# Patient Record
Sex: FEMALE | Employment: FULL TIME | URBAN - METROPOLITAN AREA
[De-identification: names, ages, dates, MRNs, and addresses within clinical notes are randomized per-mention and may not be internally consistent; named-entity substitution may affect disease eponyms.]

---

## 2020-12-15 ENCOUNTER — HOSPITAL ENCOUNTER (OUTPATIENT)
Dept: PHYSICAL THERAPY | Age: 25
Discharge: HOME | End: 2020-12-15
Payer: OTHER GOVERNMENT

## 2020-12-15 PROCEDURE — 97162 PT EVAL MOD COMPLEX 30 MIN: CPT

## 2020-12-15 PROCEDURE — 97014 ELECTRIC STIMULATION THERAPY: CPT
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
## 2020-12-15 NOTE — PROGRESS NOTES
PHYSICAL THERAPY - DAILY TREATMENT NOTE    Patient Name: Jen Welsh        Date: 12/15/2020  : 1995   YES Patient  Verified  Visit #:     Insurance: Payor:  / Plan: Tommy Muniz DEPENDENTS / Product Type:  /      In time: 9:30 A Out time: 10:25 A   Total Treatment Time: 50     BCBS/Medicare Time Tracking (below)   Total Timed Codes (min):  50 1:1 Treatment Time:  50     TREATMENT AREA = Neck pain [M54.2]    SUBJECTIVE  Pain Level (on 0 to 10 scale):  6  / 10   Medication Changes/New allergies or changes in medical history, any new surgeries or procedures?     NO    If yes, update Summary List   Subjective Functional Status/Changes:  []  No changes reported     See POC           Modalities Rationale:     decrease edema, decrease inflammation and decrease pain to improve patient's ability to return to pain-free c/s AROM with driving   15 min [x] Estim, type/location: IFC to c/s in supine                                      []  att     [x]  unatt     []  w/US     []  w/ice    [x]  w/heat    min []  Mechanical Traction: type/lbs                   []  pro   []  sup   []  int   []  cont    []  before manual    []  after manual    min []  Ultrasound, settings/location:      min []  Iontophoresis w/ dexamethasone, location:                                               []  take home patch       []  in clinic    min []  Ice     []  Heat    location/position:     min []  Vasopneumatic Device, press/temp:     min []  Other:    [] Skin assessment post-treatment (if applicable):    [x]  intact    [x]  redness- no adverse reaction     []redness  adverse reaction:      Billed With/As:   [] TE   [] TA   [] Neuro   [] Self Care Patient Education: [x] Review HEP    [] Progressed/Changed HEP based on:   [] positioning   [] body mechanics   [] transfers   [] heat/ice application    [] other:      Other Objective/Functional Measures:    See POC     Post Treatment Pain Level (on 0 to 10) scale:   4  / 10     ASSESSMENT  Assessment/Changes in Function:     See POC     []  See Progress Note/Recertification   Patient will continue to benefit from skilled PT services to modify and progress therapeutic interventions, address functional mobility deficits, address ROM deficits, address strength deficits and instruct in home and community integration to attain remaining goals. Progress toward goals / Updated goals:    Progressing towards goals established at [address]  []  Upgrade activities as tolerated YES Continue plan of care   []  Discharge due to :    []  Other:      Therapist: Román Mane, PT    Date: 12/15/2020 Time: 9:44 AM     No future appointments.

## 2020-12-18 ENCOUNTER — HOSPITAL ENCOUNTER (OUTPATIENT)
Dept: PHYSICAL THERAPY | Age: 25
Discharge: HOME | End: 2020-12-18
Payer: OTHER GOVERNMENT

## 2020-12-18 PROCEDURE — 97110 THERAPEUTIC EXERCISES: CPT

## 2020-12-18 PROCEDURE — 97014 ELECTRIC STIMULATION THERAPY: CPT

## 2020-12-18 NOTE — PROGRESS NOTES
PHYSICAL THERAPY - DAILY TREATMENT NOTE    Patient Name: Nadira Barnett        Date: 2020  : 1995   YES Patient  Verified  Visit #:      of   12  Insurance: Payor:  / Plan: Tommy Muniz DEPENDENTS / Product Type:  /      In time: 7:20 A Out time: 8:15 A   Total Treatment Time: 50     BCBS/Medicare Time Tracking (below)   Total Timed Codes (min):  NA 1:1 Treatment Time:  NA     TREATMENT AREA = Neck pain [M54.2]    SUBJECTIVE  Pain Level (on 0 to 10 scale):  6  / 10   Medication Changes/New allergies or changes in medical history, any new surgeries or procedures?     NO    If yes, update Summary List   Subjective Functional Status/Changes:  []  No changes reported     Patient reports she is having some pain in her neck & into L upper arm            Modalities Rationale:     decrease pain and increase tissue extensibility to improve patient's ability to return to pain-free ADLs   10 min [x] Estim, type/location: IFC to l/s in supine                                     []  att     [x]  unatt     []  w/US     []  w/ice    [x]  w/heat    min []  Mechanical Traction: type/lbs                   []  pro   []  sup   []  int   []  cont    []  before manual    []  after manual    min []  Ultrasound, settings/location:      min []  Iontophoresis w/ dexamethasone, location:                                               []  take home patch       []  in clinic    min []  Ice     []  Heat    location/position:     min []  Vasopneumatic Device, press/temp:     min []  Other:    [] Skin assessment post-treatment (if applicable):    []  intact    []  redness- no adverse reaction     []redness  adverse reaction:        40 min Therapeutic Exercise:  [x]  See flow sheet   Rationale:      increase ROM and increase strength to improve the patients ability to return to light lifting      Billed With/As:   [] TE   [] TA   [] Neuro   [] Self Care Patient Education: [x] Review HEP    [] Progressed/Changed HEP based on:   [] positioning   [] body mechanics   [] transfers   [] heat/ice application    [] other:      Other Objective/Functional Measures:    Initiated exercise per flow sheet      Post Treatment Pain Level (on 0 to 10) scale:   3  / 10     ASSESSMENT  Assessment/Changes in Function:     Good tolerance to initial program      []  See Progress Note/Recertification   Patient will continue to benefit from skilled PT services to modify and progress therapeutic interventions, address functional mobility deficits, address ROM deficits, address strength deficits, analyze and modify body mechanics/ergonomics and instruct in home and community integration to attain remaining goals.    Progress toward goals / Updated goals:    Progressing towards goals established at [address] [facility] [address]  []  Upgrade activities as tolerated YES Continue plan of care   []  Discharge due to :    [x]  Other: Patient needs updated HEP     Therapist: Shanique Kate PT    Date: 12/18/2020 Time: 9:30 AM     Future Appointments   Date Time Provider Federico Grewal   12/23/2020  8:15 AM Marc Watson, PT [facility]   12/28/2020 12:30 PM Ander Singh PT [facility]   12/30/2020 10:30 AM Marc Watson, PT [facility]   1/6/2021  8:15 AM Marc Watson, PT [facility]   1/7/2021  8:00 AM Mariely Gusman, PT [facility]   1/11/2021  7:15 AM Mariely Gusman, PT [facility]   1/15/2021  8:00 AM Mariely Gusman, PT [facility]   1/20/2021  8:15 AM Marc Watson, PT [facility]   1/21/2021  7:15 AM Mariely Gusman, PT [facility]   1/25/2021  7:15 AM Mariely Gusman, PT [facility]   1/29/2021  8:00 AM Alyssa Beach, PT [facility]

## 2020-12-23 ENCOUNTER — HOSPITAL ENCOUNTER (OUTPATIENT)
Dept: PHYSICAL THERAPY | Age: 25
Discharge: HOME | End: 2020-12-23
Payer: OTHER GOVERNMENT

## 2020-12-23 PROCEDURE — 97110 THERAPEUTIC EXERCISES: CPT

## 2020-12-23 PROCEDURE — 97140 MANUAL THERAPY 1/> REGIONS: CPT

## 2020-12-23 NOTE — PROGRESS NOTES
PHYSICAL THERAPY - DAILY TREATMENT NOTE    Patient Name: Guerita Finley        Date: 2020  : 1995   YES Patient  Verified  Visit #:   3   of   12  Insurance: Payor:  / Plan: Tommy Muniz DEPENDENTS / Product Type:  /      In time: 36 Out time: 900   Total Treatment Time: 40     BCBS/Medicare Time Tracking (below)   Total Timed Codes (min):   1:1 Treatment Time:       TREATMENT AREA =  Neck pain [M54.2]    SUBJECTIVE  Pain Level (on 0 to 10 scale):  5  / 10   Medication Changes/New allergies or changes in medical history, any new surgeries or procedures? NO    If yes, update Summary List   Subjective Functional Status/Changes:  []  No changes reported     I have a HA most days and sometimes wake up with one.  I always have knots in my muscles neck and back of my L shoulder        Modalities Rationale:     decrease inflammation, decrease pain and increase tissue extensibility to improve patient's ability to perform ADLs   min [] Estim, type/location:                                      []  att     []  unatt     []  w/US     []  w/ice    []  w/heat    min []  Mechanical Traction: type/lbs                   []  pro   []  sup   []  int   []  cont    []  before manual    []  after manual    min []  Ultrasound, settings/location:      min []  Iontophoresis w/ dexamethasone, location:                                               []  take home patch       []  in clinic   PD min []  Ice     []  Heat    location/position:     min []  Vasopneumatic Device, press/temp:     min []  Other:    [x] Skin assessment post-treatment (if applicable):    [x]  intact    [x]  redness- no adverse reaction     []redness  adverse reaction:        30 min Therapeutic Exercise:  [x]  See flow sheet   Rationale:      increase ROM and increase strength to improve the patients ability to perform unlimted ADLs     10 min Manual Therapy: Technique:      [x] S/DTM []IASTM []PROM  [] Passive Stretching  [x]manual TPR  [x]Jt manipulation:Gr I [] II []  III [x] IV[x] V[]  Treatment/Area:  SOR and inhibitive distraction, OA fwd nod L>R, OA rot at end range SBL   Rationale:      decrease pain, increase ROM, increase tissue extensibility and decrease trigger points to improve patient's ability to dec HA frequency  The manual therapy interventions were performed at a separate and distinct time from the therapeutic activities interventions. Billed With/As:   [x] TE   [] TA   [] Neuro   [] Self Care Patient Education: [x] Review HEP    [] Progressed/Changed HEP based on:   [] positioning   [] body mechanics   [] transfers   [] heat/ice application    [x] other: Issued written HEP and reviewed use of cervical roll in pillow when sleeping     Other Objective/Functional Measures:    Improved L OA fwd nod after MT  TE per FS     Post Treatment Pain Level (on 0 to 10) scale:   3-4  / 10     ASSESSMENT  Assessment/Changes in Function:     Good release of TP throughout L UT, rhomboids and infraspinatus with theracane. Difficulty performing diaphragm breathing without significant recruitment of secondary/upper respiratory mm     []  See Progress Note/Recertification   Patient will continue to benefit from skilled PT services to modify and progress therapeutic interventions, address functional mobility deficits, address ROM deficits, address strength deficits, analyze and address soft tissue restrictions, analyze and cue movement patterns, analyze and modify body mechanics/ergonomics, assess and modify postural abnormalities, address imbalance/dizziness and instruct in home and community integration to attain remaining goals.    Progress toward goals / Updated goals:    progresing towards STG1     PLAN  [x]  Upgrade activities as tolerated YES Continue plan of care   []  Discharge due to :    []  Other:      Therapist: Skye Peter, PT, DPT, MTC, CMTPT    Date: 12/23/2020 Time: 4:58 PM     Future Appointments   Date Time Provider Federico Grewal   12/28/2020 12:30 PM [facility] Waite, [facility]   12/30/2020 10:30 AM Shaun Gleason, PT [facility]   1/6/2021  8:15 AM Shaun Gleason, PT [facility]   1/7/2021  8:00 AM Lenoard Kiss, PT [facility]   1/11/2021  7:15 AM Lenoard Kiss, PT [facility]   1/15/2021  8:00 AM Lenoard Kiss, PT [facility]   1/20/2021  8:00 AM Valery Ahr [facility]   1/21/2021  7:15 AM Lenoard Kiss, PT [facility]   1/25/2021  7:15 AM Lenoard Kiss, PT [facility]   1/26/2021  7:15 AM Amina Waite, PT [facility]

## 2020-12-28 ENCOUNTER — HOSPITAL ENCOUNTER (OUTPATIENT)
Dept: PHYSICAL THERAPY | Age: 25
Discharge: HOME | End: 2020-12-28
Payer: OTHER GOVERNMENT

## 2020-12-28 PROCEDURE — 97014 ELECTRIC STIMULATION THERAPY: CPT

## 2020-12-28 PROCEDURE — 97110 THERAPEUTIC EXERCISES: CPT

## 2020-12-28 PROCEDURE — 97140 MANUAL THERAPY 1/> REGIONS: CPT
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
## 2020-12-30 ENCOUNTER — HOSPITAL ENCOUNTER (OUTPATIENT)
Dept: PHYSICAL THERAPY | Age: 25
Discharge: HOME | End: 2020-12-30
Payer: OTHER GOVERNMENT

## 2020-12-30 PROCEDURE — 97140 MANUAL THERAPY 1/> REGIONS: CPT

## 2020-12-30 PROCEDURE — 97110 THERAPEUTIC EXERCISES: CPT

## 2020-12-30 PROCEDURE — 97014 ELECTRIC STIMULATION THERAPY: CPT

## 2020-12-30 NOTE — PROGRESS NOTES
PHYSICAL THERAPY - DAILY TREATMENT NOTE    Patient Name: Kiran Wallace        Date: 2020  : 1995   YES Patient  Verified  Visit #:      12  Insurance: Payor:  / Plan: ScottTommy Calabrese DEPENDENTS / Product Type:  /      In time: 8887 Out time: 1140   Total Treatment Time: 60     BCBS/Medicare Time Tracking (below)   Total Timed Codes (min):   1:1 Treatment Time:       TREATMENT AREA =  Neck pain [M54.2]    SUBJECTIVE  Pain Level (on 0 to 10 scale):  0  / 10   Medication Changes/New allergies or changes in medical history, any new surgeries or procedures? NO    If yes, update Summary List   Subjective Functional Status/Changes:  []  No changes reported     No HA today or yesterday and no pain/tingling in my L arm.  Mary been working on breathing using my diaphragm more        Modalities Rationale:     decrease inflammation, decrease pain and increase tissue extensibility to improve patient's ability to perform ADLs  15 min [x] Estim, type/location: IFC to C/S in supine                                     []  att     [x]  unatt     []  w/US     []  w/ice    [x]  w/heat    min []  Mechanical Traction: type/lbs                   []  pro   []  sup   []  int   []  cont    []  before manual    []  after manual    min []  Ultrasound, settings/location:      min []  Iontophoresis w/ dexamethasone, location:                                               []  take home patch       []  in clinic    min []  Ice     []  Heat    location/position:     min []  Vasopneumatic Device, press/temp:     min []  Other:    [x] Skin assessment post-treatment (if applicable):    [x]  intact    [x]  redness- no adverse reaction     []redness  adverse reaction:        35 min Therapeutic Exercise:  [x]  See flow sheet   Rationale:      increase ROM and increase strength to improve the patients ability to perform unlimted ADLs     10 min Manual Therapy: Technique:      [x] S/DTM []IASTM []PROM  [x] Passive Stretching  [x]manual TPR  [x]Jt manipulation:Gr I [] II []  III [x] IV[x] V[]  Treatment/Area:  SOR and inhibitive distraction, OA fwd nod L>R, OA side glide, CR U UT and lev scap stretch   Rationale:      decrease pain, increase ROM, increase tissue extensibility and decrease trigger points to improve patient's ability to dec HA frequency  The manual therapy interventions were performed at a separate and distinct time from the therapeutic activities interventions. Billed With/As:   [x] TE   [] TA   [] Neuro   [] Self Care Patient Education: [x] Review HEP    [] Progressed/Changed HEP based on:   [] positioning   [] body mechanics   [] transfers   [] heat/ice application    [x] other:      Other Objective/Functional Measures:    TE per FS, added book opener and tband row with rotation     Post Treatment Pain Level (on 0 to 10) scale:   0  / 10     ASSESSMENT  Assessment/Changes in Function:     Repeated cueing to dec UT recruitment and isolate scap retract with T/S ext during stand tband exercises     []  See Progress Note/Recertification   Patient will continue to benefit from skilled PT services to modify and progress therapeutic interventions, address functional mobility deficits, address ROM deficits, address strength deficits, analyze and address soft tissue restrictions, analyze and cue movement patterns, analyze and modify body mechanics/ergonomics, assess and modify postural abnormalities, address imbalance/dizziness and instruct in home and community integration to attain remaining goals.    Progress toward goals / Updated goals:    met STG1     PLAN  [x]  Upgrade activities as tolerated YES Continue plan of care   []  Discharge due to :    []  Other:      Therapist: Amy Jordan, PT, DPT, MTC, CMTPT    Date: 12/30/2020 Time: 4:58 PM     Future Appointments   Date Time Provider Federico Grewal   1/6/2021  8:15 AM Betina Singh PT [facility]   1/7/2021  8:00 AM Baylee Jim Taylor, PT [facility]   1/11/2021  7:15 AM Lay Hu, PT [facility]   1/15/2021  8:00 AM Lay Hu, PT [facility]   1/20/2021  8:00 AM Yesika Wood [facility]   1/21/2021  7:15 AM Lay Hu, PT [facility]   1/25/2021  7:15 AM Lay Hu, PT [facility]   1/26/2021  7:15 AM Denzil Mcburney, PT [facility]

## 2021-01-06 ENCOUNTER — HOSPITAL ENCOUNTER (OUTPATIENT)
Dept: PHYSICAL THERAPY | Age: 26
Discharge: HOME | End: 2021-01-06
Payer: OTHER GOVERNMENT

## 2021-01-06 PROCEDURE — 97110 THERAPEUTIC EXERCISES: CPT

## 2021-01-06 PROCEDURE — 97530 THERAPEUTIC ACTIVITIES: CPT

## 2021-01-06 NOTE — PROGRESS NOTES
PHYSICAL THERAPY - DAILY TREATMENT NOTE    Patient Name: Ashley Neal        Date: 2021  : 1995   YES Patient  Verified  Visit #:      12  Insurance: Payor:  / Plan: Tommy Muniz DEPENDENTS / Product Type:  /      In time: 815 Out time: 905   Total Treatment Time: 45     BCBS/Medicare Time Tracking (below)   Total Timed Codes (min):   1:1 Treatment Time:       TREATMENT AREA =  Neck pain [M54.2]    SUBJECTIVE  Pain Level (on 0 to 10 scale):  5  / 10   Medication Changes/New allergies or changes in medical history, any new surgeries or procedures? NO    If yes, update Summary List   Subjective Functional Status/Changes:  []  No changes reported     Still havent had a headache and no tingling in my arm. Just tightness throughout my neck and back of shoulder but the exercises are helping.  My back has been bothering me more and I have tingling down my leg, worse when I'm sitting and bending over        Modalities Rationale:     decrease inflammation, decrease pain and increase tissue extensibility to improve patient's ability to perform ADLs  PD min [] Estim, type/location:                                     []  att     [x]  unatt     []  w/US     []  w/ice    [x]  w/heat    min []  Mechanical Traction: type/lbs                   []  pro   []  sup   []  int   []  cont    []  before manual    []  after manual    min []  Ultrasound, settings/location:      min []  Iontophoresis w/ dexamethasone, location:                                               []  take home patch       []  in clinic    min []  Ice     []  Heat    location/position:     min []  Vasopneumatic Device, press/temp:     min []  Other:    [x] Skin assessment post-treatment (if applicable):    [x]  intact    [x]  redness- no adverse reaction     []redness  adverse reaction:        30 min Therapeutic Exercise:  [x]  See flow sheet   Rationale:      increase ROM and increase strength to improve the patients ability to perform unlimted ADLs     15 min Therapeutic Activity: [x]  See flow sheet   Rationale:    increase ROM, increase strength, improve coordination, improve balance and increase proprioception to improve the patients ability to perform ADLs    Billed With/As:   [x] TE   [] TA   [] Neuro   [] Self Care Patient Education: [x] Review HEP    [] Progressed/Changed HEP based on:   [] positioning   [] body mechanics   [] transfers   [] heat/ice application    [x] other: Issued written HEP and reviewed     Other Objective/Functional Measures: Added prone>RAKAN>REIL and TICO     Post Treatment Pain Level (on 0 to 10) scale:   3-4  / 10     ASSESSMENT  Assessment/Changes in Function:     Good tolerance to extension based exercises. Reviewed importance of postural mechanics, lumbar roll in sitting, maintaining neutral lumbar spine when bending/squatting. Reviewed golfers lift and hip hinge during sit<>stand transfers and squatting     []  See Progress Note/Recertification   Patient will continue to benefit from skilled PT services to modify and progress therapeutic interventions, address functional mobility deficits, address ROM deficits, address strength deficits, analyze and address soft tissue restrictions, analyze and cue movement patterns, analyze and modify body mechanics/ergonomics, assess and modify postural abnormalities, address imbalance/dizziness and instruct in home and community integration to attain remaining goals.    Progress toward goals / Updated goals:    Met STG2     PLAN  [x]  Upgrade activities as tolerated YES Continue plan of care   []  Discharge due to :    []  Other:      Therapist: Tammy Hernandez PT, DPT, MTC, CMTPT    Date: 1/6/2021 Time: 4:58 PM     Future Appointments   Date Time Provider Federico Grewal   1/7/2021  8:00 AM Xiomara Phillips, PT [facility]   1/11/2021  7:15 AM Aurea Beach, [facility]   1/15/2021  8:00 AM Aurea Beach, PT [facility] 1/20/2021  8:00 AM Blanche Dawson [facility]   1/21/2021  7:15 AM Elisabet Barrera, KALEB [facility]   1/25/2021  7:15 AM Elisabet Barrera, KALEB [facility]   1/26/2021  7:15 AM Pranav Hare PT [facility]

## 2021-01-11 ENCOUNTER — HOSPITAL ENCOUNTER (OUTPATIENT)
Dept: PHYSICAL THERAPY | Age: 26
Discharge: HOME | End: 2021-01-11
Payer: OTHER GOVERNMENT

## 2021-01-11 PROCEDURE — 97110 THERAPEUTIC EXERCISES: CPT

## 2021-01-11 NOTE — PROGRESS NOTES
PHYSICAL THERAPY - DAILY TREATMENT NOTE    Patient Name: Faye Duran        Date: 2021  : 1995   YES Patient  Verified  Visit #:     Insurance: Payor:  / Plan: Tommy Muniz DEPENDENTS / Product Type:  /       In time: 7:20 A Out time: 8 A   Total Treatment Time: 40     BCBS/Medicare Time Tracking (below)   Total Timed Codes (min):  NA 1:1 Treatment Time:  Na     TREATMENT AREA = Neck pain [M54.2]    SUBJECTIVE  Pain Level (on 0 to 10 scale):  5  / 10   Medication Changes/New allergies or changes in medical history, any new surgeries or procedures? NO    If yes, update Summary List   Subjective Functional Status/Changes:  []  No changes reported     Patient reports her H/A has subsided, tingling in her L arm & pain tends to be worse when she is cold. She is going to talk to her doctor about her lower back pain & leg pain.          Modalities Rationale:    Patient deferred   min [] Estim, type/location:                                      []  att     []  unatt     []  w/US     []  w/ice    []  w/heat    min []  Mechanical Traction: type/lbs                   []  pro   []  sup   []  int   []  cont    []  before manual    []  after manual    min []  Ultrasound, settings/location:      min []  Iontophoresis w/ dexamethasone, location:                                               []  take home patch       []  in clinic    min []  Ice     []  Heat    location/position:     min []  Vasopneumatic Device, press/temp:     min []  Other:    [] Skin assessment post-treatment (if applicable):    []  intact    []  redness- no adverse reaction     []redness  adverse reaction:        40 min Therapeutic Exercise:  [x]  See flow sheet   Rationale:      increase ROM and increase strength to improve the patients ability to return to pain-free standing at work      Billed With/As:   [] TE   [] TA   [] Neuro   [] Self Care Patient Education: [x] Review HEP    [] Progressed/Changed HEP based on:   [] positioning   [] body mechanics   [] transfers   [] heat/ice application    [] other:      Other Objective/Functional Measures:  See flow sheet     Post Treatment Pain Level (on 0 to 10) scale:   3 / 10     ASSESSMENT  Assessment/Changes in Function:     No increase in pain in c/s or L UE pain today      []  See Progress Note/Recertification   Patient will continue to benefit from skilled PT services to modify and progress therapeutic interventions, address functional mobility deficits, address ROM deficits, address strength deficits, analyze and address soft tissue restrictions, analyze and cue movement patterns, analyze and modify body mechanics/ergonomics, assess and modify postural abnormalities and instruct in home and community integration to attain remaining goals.    Progress toward goals / Updated goals:    Progressing towards STG 3 & STG 2 met     PLAN  []  Upgrade activities as tolerated YES Continue plan of care   []  Discharge due to :    []  Other:      Therapist: Cinda Curiel, PT    Date: 1/11/2021 Time: 7:31 AM     Future Appointments   Date Time Provider Federico Grewal   1/15/2021  8:00 AM Judy Burnett, PT [facility]   1/20/2021  8:00 AM Vineet Crisostomo [facility]   1/21/2021  7:15 AM Judy Burnett PT [facility]   1/25/2021  7:15 AM Judy Burnett PT [facility]   1/26/2021  7:15 AM Tab Serrano PT [facility]

## 2021-01-15 ENCOUNTER — HOSPITAL ENCOUNTER (OUTPATIENT)
Dept: PHYSICAL THERAPY | Age: 26
Discharge: HOME | End: 2021-01-15
Payer: OTHER GOVERNMENT

## 2021-01-15 PROCEDURE — 97014 ELECTRIC STIMULATION THERAPY: CPT

## 2021-01-15 PROCEDURE — 97110 THERAPEUTIC EXERCISES: CPT

## 2021-01-15 NOTE — PROGRESS NOTES
[facility] PHYSICAL THERAPY AT [address] [place] [address]  [address], [address], [address] [facility] [address]  Phone: [phone]  Fax: [phone]  PROGRESS NOTE  Patient Name: Dwayne Magaña : 1995   Treatment/Medical Diagnosis: Neck pain [M54.2]   Referral Source: Julián Haynes MD     Date of Initial Visit: 12-15-20 Attended Visits: 8 Missed Visits: 1     SUMMARY OF TREATMENT  Therapeutic exercise including ROM, stretching, gentle strengthening, stabilization training, postural ed, patient education, HEP instruction, ESTIM with P. CURRENT STATUS  Ms. Zenaida Rollins continues to make steady progress with current treatment & reports c/o c/s pain have improved & previous c/o numbness/tinging into L UE have resolved. She continues to report constant c/o pain in L upper arm/shoulder. C/s AROM is now full & pain-free in all planes, Spurling's test did not reproduce c/s or L UE sx today, L UTT continues to be mildly (+) for pain reproduction although improved since evaluation. Upon assessment of L shoulder today, L shoulder flexion/ABD was mildly limited by end range pain, impingement tests were (+) for reproduction of L upper arm pain & resisted L IR was limited by pain today 4/5. She is unable to lay in L S/L to sleep at night due to pain. She will have pain management consultation on 21 as referred by PCP. Goal/Measure of Progress Goal Met? 1.  Establish HEP to prevent further disability. Status at last Eval: NA Current Status: HEP established  yes   2. Patient will report decreased c/o pain to < or = 4-5/10 to facilitate work activities with manageable sx in c/s. Status at last Eval: Average 7/10  At worst 10/10 Current Status: Average 1/10 in c/s, 7/10 in L shoulder  At best 0/10 in c/s, 3/10 in L shoulder progressing   3. Improve FOTO score from 44 points to > or = 50 points indicating improved tolerance with ADLs in regards to c/s.    Status at last Eval: [facility] Current Status: 57 yes   4. Patient to report 50% improvement in L UE symptoms in preparation for return to recreational activities with manageable sx in c/s. Status at last Eval: NA Current Status: Unable to quantify (see above) progressing     New Goals to be achieved in __3-4__  weeks:  1. Improve FOTO score from 57 points to > or = 67 points indicating improved tolerance with ADLs in regards to c/s.   2.  Patient to report 50% improvement in L UE symptoms in preparation for return to recreational activities with manageable sx in c/s.   3.  Patient will demonstrate (-) neural tension with ULTT test on L UE to overhead activities with manageable sx. 4.  Patient will report decreased c/o pain in L UE to < or = 4-5/10 to facilitate light lifting at work with manageable sx. RECOMMENDATIONS  Patient to continue with PT for up to 3-4 more weeks in order to progress towards achieving all LTGs. Please indicate your recommendation or any changes to treatment given c/o persistent L shoulder pain. If you have any questions/comments please contact us directly at [phone]. Thank you for allowing us to assist in the care of your patient. Therapist Signature: JEREMIE Bernabe, cert MDT Date: 1/04/1230     Time: 8:02 AM   NOTE TO PHYSICIAN:  PLEASE COMPLETE THE ORDERS BELOW AND FAX TO   [facility]: [phone]. If you are unable to process this request in 24 hours please contact our office: [phone].    ___ I have read the above report and request that my patient continue as recommended.   ___ I have read the above report and request that my patient continue therapy with the following changes/special instructions:_________________________________________________________   ___ I have read the above report and request that my patient be discharged from therapy.      Physician Signature:        Date:       Time:

## 2021-01-15 NOTE — PROGRESS NOTES
PHYSICAL THERAPY - DAILY TREATMENT NOTE    Patient Name: Jewel Spine        Date: 1/15/2021  : 1995   YES Patient  Verified  Visit #:   8   of   12  Insurance: Payor:  / Plan: ScottTommy Calabrese DEPENDENTS / Product Type:  /      In time: 8:10 A Out time: 9:15 A   Total Treatment Time: 50     BCBS/Medicare Time Tracking (below)   Total Timed Codes (min):  NA 1:1 Treatment Time:  NA     TREATMENT AREA = Neck pain [M54.2]    SUBJECTIVE  Pain Level (on 0 to 10 scale):  4-5  / 10   Medication Changes/New allergies or changes in medical history, any new surgeries or procedures?     NO    If yes, update Summary List   Subjective Functional Status/Changes:  []  No changes reported     See progress note to MD           Modalities Rationale:     decrease pain to improve patient's ability to return to pain-free ADLs  15 min [x] Estim, type/location: IFC to c/s in supine                                     []  att     [x]  unatt     []  w/US     []  w/ice    [x]  w/heat    min []  Mechanical Traction: type/lbs                   []  pro   []  sup   []  int   []  cont    []  before manual    []  after manual    min []  Ultrasound, settings/location:      min []  Iontophoresis w/ dexamethasone, location:                                               []  take home patch       []  in clinic    min []  Ice     []  Heat    location/position:     min []  Vasopneumatic Device, press/temp:     min []  Other:    [] Skin assessment post-treatment (if applicable):    [x]  intact    [x]  redness- no adverse reaction     []redness  adverse reaction:        35 min Therapeutic Exercise:  [x]  See flow sheet   Rationale:      increase ROM and increase strength to improve the patients ability to return to pain-free use of L UE with lifting     Billed With/As:   [] TE   [] TA   [] Neuro   [] Self Care Patient Education: [x] Review HEP    [] Progressed/Changed HEP based on:   [] positioning   [] body mechanics [] transfers   [] heat/ice application    [] other:      Other Objective/Functional Measures:    FOTO 57 points  (+) ULTT on L (mild)  (-) Spurling's B  C/s AROM full, pain-free, no effect on L UE     Post Treatment Pain Level (on 0 to 10) scale:   3  / 10     ASSESSMENT  Assessment/Changes in Function:     Steady progress with pain reduction in c/s, L UE pain reproduced with assessement L shoulder (see progress note)     []  See Progress Note/Recertification   Patient will continue to benefit from skilled PT services to modify and progress therapeutic interventions, address functional mobility deficits, address ROM deficits, address strength deficits, analyze and modify body mechanics/ergonomics, assess and modify postural abnormalities and instruct in home and community integration to attain remaining goals.    Progress toward goals / Updated goals:    Progressing towards STG 2 & STG 1, 3 met     PLAN  []  Upgrade activities as tolerated YES Continue plan of care   []  Discharge due to :    []  Other:      Therapist: Suzan Leyva PT    Date: 1/15/2021 Time: 8:01 AM     Future Appointments   Date Time Provider Federico Grewal   1/20/2021  8:00 AM Anabela Covarrubias [facility]   1/21/2021  7:15 AM Elliot Floyd PT [facility]   1/25/2021  7:15 AM Elliot Floyd PT [facility]   1/26/2021  7:15 AM Meagan Goodwin PT [facility]

## 2021-01-20 ENCOUNTER — APPOINTMENT (OUTPATIENT)
Dept: PHYSICAL THERAPY | Age: 26
End: 2021-01-20
Payer: OTHER GOVERNMENT

## 2021-01-21 ENCOUNTER — HOSPITAL ENCOUNTER (OUTPATIENT)
Dept: PHYSICAL THERAPY | Age: 26
Discharge: HOME | End: 2021-01-21
Payer: OTHER GOVERNMENT

## 2021-01-21 PROCEDURE — 97110 THERAPEUTIC EXERCISES: CPT

## 2021-01-21 PROCEDURE — 97014 ELECTRIC STIMULATION THERAPY: CPT

## 2021-01-21 NOTE — PROGRESS NOTES
PHYSICAL THERAPY - DAILY TREATMENT NOTE    Patient Name: Lolita Glez        Date: 2021  : 1995   YES Patient  Verified  Visit #:     Insurance: Payor:  / Plan: Tommy Muniz DEPENDENTS / Product Type:  /      In time: 7:20 A Out time: 8:15 A   Total Treatment Time: 50     BCBS/Medicare Time Tracking (below)   Total Timed Codes (min):  NA 1:1 Treatment Time:  NA     TREATMENT AREA = Neck pain [M54.2]    SUBJECTIVE  Pain Level (on 0 to 10 scale):  8  / 10   Medication Changes/New allergies or changes in medical history, any new surgeries or procedures? NO    If yes, update Summary List   Subjective Functional Status/Changes:  []  No changes reported     Patient reports this is feeling a lot of pain in her L shoulder, she will have f/u with pain management tomorrow & she will get injections. In the past she has had dry needling, traction, somatovisceral treatments & none of them helped or gave long-term relief in her pain.            Modalities Rationale:     decrease pain to improve patient's ability to return to pain-free ADLs  15 min [x] Estim, type/location: IFC to c/s in supine                                     []  att     [x]  unatt     []  w/US     []  w/ice    [x]  w/heat    min []  Mechanical Traction: type/lbs                   []  pro   []  sup   []  int   []  cont    []  before manual    []  after manual    min []  Ultrasound, settings/location:      min []  Iontophoresis w/ dexamethasone, location:                                               []  take home patch       []  in clinic    min []  Ice     []  Heat    location/position:     min []  Vasopneumatic Device, press/temp:     min []  Other:    [] Skin assessment post-treatment (if applicable):    []  intact    []  redness- no adverse reaction     []redness  adverse reaction:        35 min Therapeutic Exercise:  [x]  See flow sheet   Rationale:      increase ROM and increase strength to improve the patients ability to return to pain-free lifting at work     Billed With/As:   [] TE   [] TA   [] Neuro   [] Self Care Patient Education: [x] Review HEP    [] Progressed/Changed HEP based on:   [] positioning   [] body mechanics   [] transfers   [] heat/ice application    [] other:      Other Objective/Functional Measures:    Resumed supine OA nod & c/s retraction in sitting     Post Treatment Pain Level (on 0 to 10) scale:   6  / 10     ASSESSMENT  Assessment/Changes in Function:     Slow progress with pain reduction with L UE symptoms      []  See Progress Note/Recertification   Patient will continue to benefit from skilled PT services to modify and progress therapeutic interventions, address functional mobility deficits, address ROM deficits, address strength deficits, analyze and address soft tissue restrictions, analyze and cue movement patterns, analyze and modify body mechanics/ergonomics, assess and modify postural abnormalities and instruct in home and community integration to attain remaining goals.    Progress toward goals / Updated goals:    Progressing towards newly established LTGs      PLAN  []  Upgrade activities as tolerated YES Continue plan of care   []  Discharge due to :    []  Other:      Therapist: Vanita Alcantara, PT    Date: 1/21/2021 Time: 8:10 AM     Future Appointments   Date Time Provider Federico Grewal   1/25/2021  7:15 AM Shoshana Marshall, PT [facility]   1/26/2021  7:15 AM Paulino Glez, PT [facility]

## 2021-01-21 NOTE — PROGRESS NOTES
[facility] at [address] [facility] [address] [facility], [address] [place] [address]  Phone [phone]   Fax: [phone]    Physical Therapy Home E-STIM Unit Recommendation      Patient's name Dwayne Magaña    Referring Physician:Dr. Herminia Morrison   Treatment Diagnosis: c/s radiculopathy, L UE pain YOB: 1995   MRN: [id number]     Onset Date:chronic  Date of Service:1/21/2021    Total Treatments: 9       Patient may benefit from TENS/IFC unit to assist with pain relief at home given good relief in symptoms with use of E-STIM with physical therapy. If you are in agreement, please sign below. Thank you for this referral.        Thank you,  Therapist: Chasidy Turcios PT  Date: 1/21/2021  Time:9:17 AM  _______________________________________________________________________    I certify that the above Therapy Services are being furnished while the patient is under my care. I agree with the treatment plan and certify that this therapy is necessary. Y or N I have read the above and request that my patient continue as recommended. Y or N I have read the above report and request that my patient continue therapy with the following changes/special instructions:______________________________________________________________  Y or N I have read the above report and do not wish to have a home TENS unit provided    Physician's Signature:____________________  Date:________________    Please sign and return to [facility] at [address] [facility] [address] [facility], [address] [place] [address] Road  Or fax to [phone].

## 2021-01-25 ENCOUNTER — HOSPITAL ENCOUNTER (OUTPATIENT)
Dept: PHYSICAL THERAPY | Age: 26
Discharge: HOME | End: 2021-01-25
Payer: OTHER GOVERNMENT

## 2021-01-25 PROCEDURE — 97110 THERAPEUTIC EXERCISES: CPT

## 2021-01-25 PROCEDURE — 97014 ELECTRIC STIMULATION THERAPY: CPT

## 2021-01-25 NOTE — PROGRESS NOTES
PHYSICAL THERAPY - DAILY TREATMENT NOTE    Patient Name: Rula Duffy        Date: 2021  : 1995   YES Patient  Verified  Visit #:   10   of   12  Insurance: Payor:  / Plan: Tommy Muniz DEPENDENTS / Product Type:  /      In time: 7:15 A Out time: 8:05 A   Total Treatment Time: 45     BCBS/Medicare Time Tracking (below)   Total Timed Codes (min):  NA 1:1 Treatment Time:  NA     TREATMENT AREA = Neck pain [M54.2]    SUBJECTIVE  Pain Level (on 0 to 10 scale):  6  / 10   Medication Changes/New allergies or changes in medical history, any new surgeries or procedures? NO    If yes, update Summary List   Subjective Functional Status/Changes:  []  No changes reported     Patient reports that she had shots on Friday, she believes she had lidocaine shots, it was in a number \"7\" area, across her L shoulder down towards her neck, she will get the second series on Friday.             Modalities Rationale:     decrease pain to improve patient's ability to return to pain-free ADLs  10 min [x] Estim, type/location: IFC to c/s in supine                                      []  att     [x]  unatt     []  w/US     []  w/ice    [x]  w/heat    min []  Mechanical Traction: type/lbs                   []  pro   []  sup   []  int   []  cont    []  before manual    []  after manual    min []  Ultrasound, settings/location:      min []  Iontophoresis w/ dexamethasone, location:                                               []  take home patch       []  in clinic    min []  Ice     []  Heat    location/position:     min []  Vasopneumatic Device, press/temp:     min []  Other:    [] Skin assessment post-treatment (if applicable):    [x]  intact    [x]  redness- no adverse reaction     []redness  adverse reaction:        35 min Therapeutic Exercise:  [x]  See flow sheet   Rationale:      increase ROM and increase strength to improve the patients ability to return to pain-free lifting at work Billed With/As:   [] TE   [] TA   [] Neuro   [] Self Care Patient Education: [x] Review HEP    [] Progressed/Changed HEP based on:   [] positioning   [] body mechanics   [] transfers   [] heat/ice application    [] other:      Other Objective/Functional Measures: Added palloff press using OTB & supine c/s retraction & flexion in supine      Post Treatment Pain Level (on 0 to 10) scale:   3  / 10     ASSESSMENT  Assessment/Changes in Function:     Good reduction in pain after PT today      []  See Progress Note/Recertification   Patient will continue to benefit from skilled PT services to modify and progress therapeutic interventions, address functional mobility deficits, address ROM deficits, address strength deficits, analyze and address soft tissue restrictions, analyze and cue movement patterns, analyze and modify body mechanics/ergonomics, assess and modify postural abnormalities and instruct in home and community integration to attain remaining goals.    Progress toward goals / Updated goals:    Progressing towards LTG 2 & 3     PLAN  []  Upgrade activities as tolerated YES Continue plan of care   []  Discharge due to :    []  Other:      Therapist: Reyna Turner PT    Date: 1/25/2021 Time: 9:08 AM     Future Appointments   Date Time Provider Federico Grewal   1/26/2021  7:15 AM Mari Viera, PT [facility] [place] [facility]   2/3/2021  8:15 AM Era Mary, PT [facility]   2/4/2021  7:30 AM Era Mary, PT [facility]   2/8/2021  8:15 AM Sushma Mary, PT [facility]   2/9/2021  7:15 AM Lenard Chinet, PT [facility]   2/17/2021  8:15 AM Era Mary, PT [facility]   2/18/2021  7:30 AM Era Mary, PT [facility]   2/22/2021  7:15 AM Lenard Chinet, PT [facility]   2/23/2021  7:15 AM Ed Beach, PT [facility]

## 2021-01-26 ENCOUNTER — HOSPITAL ENCOUNTER (OUTPATIENT)
Dept: PHYSICAL THERAPY | Age: 26
Discharge: HOME | End: 2021-01-26
Payer: OTHER GOVERNMENT

## 2021-01-26 PROCEDURE — 97110 THERAPEUTIC EXERCISES: CPT

## 2021-01-26 PROCEDURE — 97014 ELECTRIC STIMULATION THERAPY: CPT

## 2021-01-26 NOTE — PROGRESS NOTES
PHYSICAL THERAPY - DAILY TREATMENT NOTE    Patient Name: Roseline Lombard        Date: 2021  : 1995   YES Patient  Verified  Visit #:     Insurance: Payor:  / Plan: Tommy Muniz DEPENDENTS / Product Type:  /      In time: 720a Out time: 810a   Total Treatment Time: 50     BCBS/Medicare Time Tracking (below)   Total Timed Codes (min):  NA 1:1 Treatment Time:  NA     TREATMENT AREA =  Neck pain [M54.2]    SUBJECTIVE  Pain Level (on 0 to 10 scale):  4  / 10   Medication Changes/New allergies or changes in medical history, any new surgeries or procedures? NO    If yes, update Summary List   Subjective Functional Status/Changes:  []  No changes reported     I feel better today, pain is only around a 4 in my shoulder. Im sore from the needles on Friday still           Modalities Rationale:     decrease edema, decrease inflammation, decrease pain and increase tissue extensibility to improve patient's ability to perform pain-free ADLS.   15 min [x] Estim, type/location: IFC c/s with heat in supine                                     []  att     [x]  unatt     []  w/US     []  w/ice    [x]  w/heat    min []  Mechanical Traction: type/lbs                   []  pro   []  sup   []  int   []  cont    []  before manual    []  after manual    min []  Ultrasound, settings/location:      min []  Iontophoresis w/ dexamethasone, location:                                               []  take home patch       []  in clinic    min []  Ice     []  Heat    location/position:     min []  Vasopneumatic Device, press/temp:     min []  Other:    [x] Skin assessment post-treatment (if applicable):    [x]  intact    [x]  redness- no adverse reaction     []redness  adverse reaction:        35 min Therapeutic Exercise:  [x]  See flow sheet   Rationale:      increase ROM, increase strength and improve coordination to improve the patients ability to maintain improved posture       Billed With/As:   [x] TE   [] TA   [] Neuro   [] Self Care Patient Education: [x] Review HEP    [] Progressed/Changed HEP based on:   [] positioning   [] body mechanics   [] transfers   [] heat/ice application    [] other:      Other Objective/Functional Measures:    TE per FS  Added quadruped weight shift through UE     Post Treatment Pain Level (on 0 to 10) scale:   2-3  / 10     ASSESSMENT  Assessment/Changes in Function:     Good control through exercises with minimal reports of pain. Experiences pain in L shoulder along upper trap and occasionally posteriorly. Improvement in symptoms with TE     []  See Progress Note/Recertification   Patient will continue to benefit from skilled PT services to modify and progress therapeutic interventions, address functional mobility deficits, address ROM deficits, address strength deficits, analyze and address soft tissue restrictions, analyze and cue movement patterns, analyze and modify body mechanics/ergonomics and assess and modify postural abnormalities to attain remaining goals. Progress toward goals / Updated goals:    Progressing towards LTG #2.      PLAN  [x]  Upgrade activities as tolerated YES Continue plan of care   []  Discharge due to :    []  Other:      Therapist: Rosamaria Juarez DPT    Date: 1/26/2021 Time: 7:32 AM     Future Appointments   Date Time Provider Federico Grewal   2/3/2021  8:15 AM Monie Gong, PT [facility]   2/4/2021  7:30 AM Monie Gong, PT [facility]   2/8/2021  8:15 AM Monie Gong, PT [facility]   2/9/2021  7:15 AM Alphia Joanna, PT [facility]   2/17/2021  8:15 AM Monie Gong, PT [facility]   2/18/2021  7:30 AM Monie Gong, PT [facility]   2/22/2021  7:15 AM Shoshana Marshall, PT [facility]   2/23/2021  7:15 AM Froy Beach Si, PT [facility]

## 2021-01-29 ENCOUNTER — APPOINTMENT (OUTPATIENT)
Dept: PHYSICAL THERAPY | Age: 26
End: 2021-01-29
Payer: OTHER GOVERNMENT

## 2021-02-03 ENCOUNTER — HOSPITAL ENCOUNTER (OUTPATIENT)
Dept: PHYSICAL THERAPY | Age: 26
Discharge: HOME | End: 2021-02-03
Payer: OTHER GOVERNMENT

## 2021-02-03 PROCEDURE — 97110 THERAPEUTIC EXERCISES: CPT

## 2021-02-03 NOTE — PROGRESS NOTES
PHYSICAL THERAPY - DAILY TREATMENT NOTE    Patient Name: Tu Jaramillo        Date: 2/3/2021  : 1995   YES Patient  Verified  Visit #:     Insurance: Payor:  / Plan: Tommy Muniz DEPENDENTS / Product Type:  /      In time: 815 Out time: 905   Total Treatment Time: 45     BCBS/Medicare Time Tracking (below)   Total Timed Codes (min):   1:1 Treatment Time:       TREATMENT AREA =  Neck pain [M54.2]    SUBJECTIVE  Pain Level (on 0 to 10 scale):  4  / 10   Medication Changes/New allergies or changes in medical history, any new surgeries or procedures? NO    If yes, update Summary List   Subjective Functional Status/Changes:  []  No changes reported     im sore from getting the lidocaine injections. I havent had a HA in a month and no tingling down my arm.  Its mainly my shoulder now and hurts the most when I reach back      Modalities Rationale:     decrease inflammation, decrease pain and increase tissue extensibility to improve patient's ability to perform ADLs  PD min [] Estim, type/location:                                     []  att     [x]  unatt     []  w/US     []  w/ice    [x]  w/heat    min []  Mechanical Traction: type/lbs                   []  pro   []  sup   []  int   []  cont    []  before manual    []  after manual    min []  Ultrasound, settings/location:      min []  Iontophoresis w/ dexamethasone, location:                                               []  take home patch       []  in clinic    min []  Ice     []  Heat    location/position:     min []  Vasopneumatic Device, press/temp:     min []  Other:    [x] Skin assessment post-treatment (if applicable):    [x]  intact    [x]  redness- no adverse reaction     []redness  adverse reaction:        45 min Therapeutic Exercise:  [x]  See flow sheet   Rationale:      increase ROM and increase strength to improve the patients ability to perform unlimted ADLs     Billed With/As:   [x] TE   [] TA   [] Neuro   [] Self Care Patient Education: [x] Review HEP    [] Progressed/Changed HEP based on:   [] positioning   [] body mechanics   [] transfers   [] heat/ice application    [x] other: serratus punch     Other Objective/Functional Measures:    TE per FS  Added serratus punch, tband ER and wall slide with FR     Post Treatment Pain Level (on 0 to 10) scale:   3-4  / 10     ASSESSMENT  Assessment/Changes in Function:     Progressed treatment as appropriate with good patient tolerance, cueing for scapular stability and control during wall slides and tband ER     []  See Progress Note/Recertification   Patient will continue to benefit from skilled PT services to modify and progress therapeutic interventions, address functional mobility deficits, address ROM deficits, address strength deficits, analyze and address soft tissue restrictions, analyze and cue movement patterns, analyze and modify body mechanics/ergonomics, assess and modify postural abnormalities, address imbalance/dizziness and instruct in home and community integration to attain remaining goals.    Progress toward goals / Updated goals:    Progressing towards DC HEP goals, added serratus punch     PLAN  [x]  Upgrade activities as tolerated YES Continue plan of care   []  Discharge due to :    []  Other:      Therapist: Digna Chung, PT, DPT, MTC, CMTPT    Date: 2/3/2021 Time: 4:58 PM     Future Appointments   Date Time Provider Federico Grewal   2/4/2021  7:30 AM Marcela Interiano, PT [facility]   2/8/2021  8:15 AM Marcela Interiano, PT [facility]   2/9/2021  7:15 AM Kaye Martinez, PT [facility]   2/17/2021  8:15 AM Marcela Interiano, PT [facility]   2/18/2021  7:30 AM Marcela Interiano, PT [facility]   2/22/2021  7:15 AM Kaye Martinez, PT [facility]   2/23/2021  7:15 AM Luis E Beach, PT [facility]

## 2021-02-04 ENCOUNTER — HOSPITAL ENCOUNTER (OUTPATIENT)
Dept: PHYSICAL THERAPY | Age: 26
Discharge: HOME | End: 2021-02-04
Payer: OTHER GOVERNMENT

## 2021-02-04 PROCEDURE — 97110 THERAPEUTIC EXERCISES: CPT

## 2021-02-04 NOTE — PROGRESS NOTES
PHYSICAL THERAPY - DAILY TREATMENT NOTE    Patient Name: Tu Jaramillo        Date: 2021  : 1995   YES Patient  Verified  Visit #:   15   of   20  Insurance: Payor:  / Plan: Aroldo Tommy Metzger DEPENDENTS / Product Type:  /      In time: 134 Out time: 830   Total Treatment Time: 50     BCBS/Medicare Time Tracking (below)   Total Timed Codes (min):   1:1 Treatment Time:       TREATMENT AREA =  Neck pain [M54.2]    SUBJECTIVE  Pain Level (on 0 to 10 scale):  3-4  / 10   Medication Changes/New allergies or changes in medical history, any new surgeries or procedures?     NO    If yes, update Summary List   Subjective Functional Status/Changes:  []  No changes reported     Still a little sore around my shoulder blade from the injections      Modalities Rationale:     decrease inflammation, decrease pain and increase tissue extensibility to improve patient's ability to perform ADLs  PD min [] Estim, type/location:                                     []  att     [x]  unatt     []  w/US     []  w/ice    [x]  w/heat    min []  Mechanical Traction: type/lbs                   []  pro   []  sup   []  int   []  cont    []  before manual    []  after manual    min []  Ultrasound, settings/location:      min []  Iontophoresis w/ dexamethasone, location:                                               []  take home patch       []  in clinic    min []  Ice     []  Heat    location/position:     min []  Vasopneumatic Device, press/temp:     min []  Other:    [x] Skin assessment post-treatment (if applicable):    [x]  intact    [x]  redness- no adverse reaction     []redness  adverse reaction:        50 min Therapeutic Exercise:  [x]  See flow sheet   Rationale:      increase ROM and increase strength to improve the patients ability to perform unlimted ADLs     Billed With/As:   [x] TE   [] TA   [] Neuro   [] Self Care Patient Education: [x] Review HEP    [] Progressed/Changed HEP based on:   [] positioning   [] body mechanics   [] transfers   [] heat/ice application    [x] other: fwd/lateral plank     Other Objective/Functional Measures:    TE per FS  Added 1/2 lateral and full fwd plank for L UE loading/WBing, tband stab, body blade (90 degrees flex/scap)     Post Treatment Pain Level (on 0 to 10) scale:   3  / 10     ASSESSMENT  Assessment/Changes in Function:     Good tolerance to WBing through L UE. Pt reported inc fatigue during L tband ER compared to R but able to perform all without pain. []  See Progress Note/Recertification   Patient will continue to benefit from skilled PT services to modify and progress therapeutic interventions, address functional mobility deficits, address ROM deficits, address strength deficits, analyze and address soft tissue restrictions, analyze and cue movement patterns, analyze and modify body mechanics/ergonomics, assess and modify postural abnormalities, address imbalance/dizziness and instruct in home and community integration to attain remaining goals.    Progress toward goals / Updated goals:    Progressing towards L shoulder strength goals     PLAN  [x]  Upgrade activities as tolerated YES Continue plan of care   []  Discharge due to :    []  Other:      Therapist: Kassie Young, PT, DPT, MTC, CMTPT    Date: 2/4/2021 Time: 4:58 PM     Future Appointments   Date Time Provider Federico Grewal   2/8/2021  8:15 AM Camilo Thomas, PT [facility] [place] [facility]   2/9/2021  7:15 AM Marybel Castaneda, PT [facility]   2/17/2021  8:15 AM Camilo Thomas, PT [facility]   2/18/2021  7:30 AM Camilo Thomas, PT [facility]   2/22/2021  7:15 AM Marybel Castaneda, PT [facility]   2/23/2021  7:15 AM Jung Beach, PT [facility]

## 2021-02-08 ENCOUNTER — HOSPITAL ENCOUNTER (OUTPATIENT)
Dept: PHYSICAL THERAPY | Age: 26
Discharge: HOME | End: 2021-02-08
Payer: OTHER GOVERNMENT

## 2021-02-08 PROCEDURE — 97110 THERAPEUTIC EXERCISES: CPT

## 2021-02-08 NOTE — PROGRESS NOTES
PHYSICAL THERAPY - DAILY TREATMENT NOTE    Patient Name: Katie May        Date: 2021  : 1995   YES Patient  Verified  Visit #:     Insurance: Payor:  / Plan: Tommy Muniz DEPENDENTS / Product Type:  /      In time: 815 Out time: 900   Total Treatment Time: 45     BCBS/Medicare Time Tracking (below)   Total Timed Codes (min):   1:1 Treatment Time:       TREATMENT AREA =  Neck pain [M54.2]    SUBJECTIVE  Pain Level (on 0 to 10 scale):  3  / 10   Medication Changes/New allergies or changes in medical history, any new surgeries or procedures? NO    If yes, update Summary List   Subjective Functional Status/Changes:  []  No changes reported     The pain/discomfort is always there in my shoulder. some days are worse than others.       Modalities Rationale:     decrease inflammation, decrease pain and increase tissue extensibility to improve patient's ability to perform ADLs  PD min [] Estim, type/location:                                     []  att     [x]  unatt     []  w/US     []  w/ice    [x]  w/heat    min []  Mechanical Traction: type/lbs                   []  pro   []  sup   []  int   []  cont    []  before manual    []  after manual    min []  Ultrasound, settings/location:      min []  Iontophoresis w/ dexamethasone, location:                                               []  take home patch       []  in clinic    min []  Ice     []  Heat    location/position:     min []  Vasopneumatic Device, press/temp:     min []  Other:    [x] Skin assessment post-treatment (if applicable):    [x]  intact    [x]  redness- no adverse reaction     []redness  adverse reaction:        45 min Therapeutic Exercise:  [x]  See flow sheet   Rationale:      increase ROM and increase strength to improve the patients ability to perform unlimted ADLs     Billed With/As:   [x] TE   [] TA   [] Neuro   [] Self Care Patient Education: [x] Review HEP    [] Progressed/Changed HEP based on:   [] positioning   [] body mechanics   [] transfers   [] heat/ice application    [] other:      Other Objective/Functional Measures:    TE per FS  Added RB WS, tband ADD 3way and wall clock     Post Treatment Pain Level (on 0 to 10) scale:   3  / 10     ASSESSMENT  Assessment/Changes in Function:     Cueing for scapular stability/control. Good tolerance to UE WBing     []  See Progress Note/Recertification   Patient will continue to benefit from skilled PT services to modify and progress therapeutic interventions, address functional mobility deficits, address ROM deficits, address strength deficits, analyze and address soft tissue restrictions, analyze and cue movement patterns, analyze and modify body mechanics/ergonomics, assess and modify postural abnormalities, address imbalance/dizziness and instruct in home and community integration to attain remaining goals.    Progress toward goals / Updated goals:    Progressing towards STG4     PLAN  [x]  Upgrade activities as tolerated YES Continue plan of care   []  Discharge due to :    []  Other:      Therapist: Amy Jordan, PT, DPT, MTC, CMTPT    Date: 2/8/2021 Time: 4:58 PM     Future Appointments   Date Time Provider Federico Grewal   2/9/2021  7:15 AM Melissa Ceullar, PT [facility]   2/17/2021  8:15 AM Betina Singh PT [facility]   2/18/2021  7:30 AM Betina Singh PT [facility]   2/22/2021  8:15 AM Betina Singh PT [facility]   2/23/2021  7:15 AM Tammy Beach, PT [facility]

## 2021-02-09 ENCOUNTER — HOSPITAL ENCOUNTER (OUTPATIENT)
Dept: PHYSICAL THERAPY | Age: 26
Discharge: HOME | End: 2021-02-09
Payer: OTHER GOVERNMENT

## 2021-02-09 PROCEDURE — 97110 THERAPEUTIC EXERCISES: CPT

## 2021-02-09 NOTE — PROGRESS NOTES
PHYSICAL THERAPY - DAILY TREATMENT NOTE    Patient Name: Lolita Glez        Date: 2021  : 1995   YES Patient  Verified  Visit #:     Insurance: Payor:  / Plan: Tommy Muniz DEPENDENTS / Product Type:  /      In time: 7:15 A Out time: 8 A   Total Treatment Time: 45/  40     BCBS/Medicare Time Tracking (below)   Total Timed Codes (min):  NA 1:1 Treatment Time:  NA     TREATMENT AREA = Neck pain [M54.2]    SUBJECTIVE  Pain Level (on 0 to 10 scale):  3  / 10   Medication Changes/New allergies or changes in medical history, any new surgeries or procedures? NO    If yes, update Summary List   Subjective Functional Status/Changes:  []  No changes reported     Patient reports she will have her third series of injection this afternoon with pain management.             Modalities Rationale:    Patient deferred   min [] Estim, type/location:                                      []  att     []  unatt     []  w/US     []  w/ice    []  w/heat    min []  Mechanical Traction: type/lbs                   []  pro   []  sup   []  int   []  cont    []  before manual    []  after manual    min []  Ultrasound, settings/location:      min []  Iontophoresis w/ dexamethasone, location:                                               []  take home patch       []  in clinic    min []  Ice     []  Heat    location/position:     min []  Vasopneumatic Device, press/temp:     min []  Other:    [] Skin assessment post-treatment (if applicable):    []  intact    []  redness- no adverse reaction     []redness  adverse reaction:        45/  40 min Therapeutic Exercise:  [x]  See flow sheet   Rationale:      increase ROM and increase strength to improve the patients ability to return to pain-free lifting using L arm     Billed With/As:   [] TE   [] TA   [] Neuro   [] Self Care Patient Education: [x] Review HEP    [] Progressed/Changed HEP based on:   [] positioning   [] body mechanics   [] transfers   [] heat/ice application    [] other:      Other Objective/Functional Measures:    See flow sheet     Post Treatment Pain Level (on 0 to 10) scale:   2  / 10     ASSESSMENT  Assessment/Changes in Function:     Improved stability with scapular stabilization/strengthening in weightbearing      []  See Progress Note/Recertification   Patient will continue to benefit from skilled PT services to modify and progress therapeutic interventions, address functional mobility deficits, address ROM deficits, address strength deficits, analyze and address soft tissue restrictions, analyze and modify body mechanics/ergonomics, assess and modify postural abnormalities, address imbalance/dizziness and instruct in home and community integration to attain remaining goals.    Progress toward goals / Updated goals:    Progressing towards LTG 2 & 3     PLAN  []  Upgrade activities as tolerated YES Continue plan of care   []  Discharge due to :    []  Other:      Therapist: Harini Manrique PT    Date: 2/9/2021 Time: 8:16 AM     Future Appointments   Date Time Provider Federico Grewal   2/17/2021  8:15 AM Marcela Interiano, PT [facility]   2/18/2021  7:30 AM Marcela Interiano, PT [facility]   2/22/2021  8:15 AM Marcela Interiano, PT [facility]   2/23/2021  7:15 AM Luis E Beach, KALEB [facility]

## 2021-02-17 ENCOUNTER — HOSPITAL ENCOUNTER (OUTPATIENT)
Dept: PHYSICAL THERAPY | Age: 26
Discharge: HOME | End: 2021-02-17
Payer: OTHER GOVERNMENT

## 2021-02-17 PROCEDURE — 97110 THERAPEUTIC EXERCISES: CPT

## 2021-02-17 PROCEDURE — 97140 MANUAL THERAPY 1/> REGIONS: CPT

## 2021-02-17 NOTE — PROGRESS NOTES
PHYSICAL THERAPY - DAILY TREATMENT NOTE    Patient Name: Yury Cha        Date: 2021  : 1995   YES Patient  Verified  Visit #:   15   of   20  Insurance: Payor:  / Plan: Tommy Muniz DEPENDENTS / Product Type:  /      In time: 815 Out time: 905   Total Treatment Time: 45     BCBS/Medicare Time Tracking (below)   Total Timed Codes (min):   1:1 Treatment Time:       TREATMENT AREA =  Neck pain [M54.2]    SUBJECTIVE  Pain Level (on 0 to 10 scale):  3  / 10   Medication Changes/New allergies or changes in medical history, any new surgeries or procedures? NO    If yes, update Summary List   Subjective Functional Status/Changes:  []  No changes reported     The pain/discomfort is always there in my shoulder. some days are worse than others.       Modalities Rationale:     decrease inflammation, decrease pain and increase tissue extensibility to improve patient's ability to perform ADLs  PD min [] Estim, type/location:                                     []  att     [x]  unatt     []  w/US     []  w/ice    [x]  w/heat    min []  Mechanical Traction: type/lbs                   []  pro   []  sup   []  int   []  cont    []  before manual    []  after manual    min []  Ultrasound, settings/location:      min []  Iontophoresis w/ dexamethasone, location:                                               []  take home patch       []  in clinic    min []  Ice     []  Heat    location/position:     min []  Vasopneumatic Device, press/temp:     min []  Other:    [x] Skin assessment post-treatment (if applicable):    [x]  intact    [x]  redness- no adverse reaction     []redness  adverse reaction:        30 min Therapeutic Exercise:  [x]  See flow sheet   Rationale:      increase ROM and increase strength to improve the patients ability to perform unlimted ADLs   15 min Manual Therapy: Technique:      [] S/DTM []IASTM []PROM  [] Passive Stretching  []manual TPR  []Jt manipulation:Perfecto CABRERA [] II []  III [] IV[] V[]  Treatment/Area: mTPR to L scalenes and SCM, 1st rib depression with resp A, OP to lower ribs during diaphragm breathing     Rationale:      decrease pain, increase ROM, increase tissue extensibility and decrease trigger points to improve patient's ability to perform ADLs  The manual therapy interventions were performed at a separate and distinct time from the therapeutic activities interventions. Billed With/As:   [x] TE   [] TA   [] Neuro   [] Self Care Patient Education: [x] Review HEP    [] Progressed/Changed HEP based on:   [] positioning   [] body mechanics   [] transfers   [] heat/ice application    [] other:      Other Objective/Functional Measures:    TE per FS  Added SCM and scalene stretch, diaphragm breathing in supine and sitting     Post Treatment Pain Level (on 0 to 10) scale:   3  / 10     ASSESSMENT  Assessment/Changes in Function:     Difficulty minimizing secondary respiratory mm over recruitment towards end of breath. Good release of scalenes and SCM with dec c/o TP referral pain following MT     []  See Progress Note/Recertification   Patient will continue to benefit from skilled PT services to modify and progress therapeutic interventions, address functional mobility deficits, address ROM deficits, address strength deficits, analyze and address soft tissue restrictions, analyze and cue movement patterns, analyze and modify body mechanics/ergonomics, assess and modify postural abnormalities, address imbalance/dizziness and instruct in home and community integration to attain remaining goals.    Progress toward goals / Updated goals:    Progressing towards LTG1     PLAN  [x]  Upgrade activities as tolerated YES Continue plan of care   []  Discharge due to :    []  Other:      Therapist: Ximena Caballero PT, DPT, MTC, CMTPT    Date: 2/17/2021 Time: 4:58 PM     Future Appointments   Date Time Provider Federico Grewal   2/18/2021  7:30 AM Abimbola Bland PT [facility] 2/22/2021  8:15 AM June Argueta, PT [facility]   2/23/2021  7:15 AM Nidhi Beach, PT [facility]

## 2021-02-18 ENCOUNTER — HOSPITAL ENCOUNTER (OUTPATIENT)
Dept: PHYSICAL THERAPY | Age: 26
Discharge: HOME | End: 2021-02-18
Payer: OTHER GOVERNMENT

## 2021-02-18 PROCEDURE — 97110 THERAPEUTIC EXERCISES: CPT

## 2021-02-18 NOTE — PROGRESS NOTES
PHYSICAL THERAPY - DAILY TREATMENT NOTE    Patient Name: Osiris Lopez        Date: 2021  : 1995   YES Patient  Verified  Visit #:     Insurance: Payor:  / Plan: Tommy Muniz DEPENDENTS / Product Type:  /      In time: 510 Out time: 825   Total Treatment Time: 45     BCBS/Medicare Time Tracking (below)   Total Timed Codes (min):   1:1 Treatment Time:       TREATMENT AREA =  Neck pain [M54.2]    SUBJECTIVE  Pain Level (on 0 to 10 scale):  3  / 10   Medication Changes/New allergies or changes in medical history, any new surgeries or procedures? NO    If yes, update Summary List   Subjective Functional Status/Changes:  []  No changes reported     A little sore in the neck area from stretching the muscles.  Mary been practicing the breathing a lot and its getting easier      Modalities Rationale:     decrease inflammation, decrease pain and increase tissue extensibility to improve patient's ability to perform ADLs  PD min [] Estim, type/location:                                     []  att     [x]  unatt     []  w/US     []  w/ice    [x]  w/heat    min []  Mechanical Traction: type/lbs                   []  pro   []  sup   []  int   []  cont    []  before manual    []  after manual    min []  Ultrasound, settings/location:      min []  Iontophoresis w/ dexamethasone, location:                                               []  take home patch       []  in clinic    min []  Ice     []  Heat    location/position:     min []  Vasopneumatic Device, press/temp:     min []  Other:    [x] Skin assessment post-treatment (if applicable):    [x]  intact    [x]  redness- no adverse reaction     []redness  adverse reaction:        45 min Therapeutic Exercise:  [x]  See flow sheet   Rationale:      increase ROM and increase strength to improve the patients ability to perform unlimted ADLs     Billed With/As:   [x] TE   [] TA   [] Neuro   [] Self Care Patient Education: [x] Review HEP    [] Progressed/Changed HEP based on:   [] positioning   [] body mechanics   [] transfers   [] heat/ice application    [] other:      Other Objective/Functional Measures:    TE per FS  Added supine march and supine alt arm raise (mod dead bug without knee ext) during diaphragm breathing  Added 1/2 side plank dips and CW/CCW ball on wall    Focused on diaphragm breathing during all TE     Post Treatment Pain Level (on 0 to 10) scale:   3  / 10     ASSESSMENT  Assessment/Changes in Function:     Improved diaphragmatic breathing requiring fewer cues to minimize excessive recruitment of secondary respiratory mm and upper rib flare. []  See Progress Note/Recertification   Patient will continue to benefit from skilled PT services to modify and progress therapeutic interventions, address functional mobility deficits, address ROM deficits, address strength deficits, analyze and address soft tissue restrictions, analyze and cue movement patterns, analyze and modify body mechanics/ergonomics, assess and modify postural abnormalities, address imbalance/dizziness and instruct in home and community integration to attain remaining goals.    Progress toward goals / Updated goals:    Progressing towards STG4     PLAN  [x]  Upgrade activities as tolerated YES Continue plan of care   []  Discharge due to :    []  Other:      Therapist: Chuck Farr, PT, DPT, MTC, CMTPT    Date: 2/18/2021 Time: 4:58 PM     Future Appointments   Date Time Provider Federico Grewal   2/22/2021  8:15 AM Sushma Hernandez PT [facility]   2/23/2021  7:15 AM [facility]

## 2021-02-22 ENCOUNTER — APPOINTMENT (OUTPATIENT)
Dept: PHYSICAL THERAPY | Age: 26
End: 2021-02-22
Payer: OTHER GOVERNMENT

## 2021-02-23 ENCOUNTER — APPOINTMENT (OUTPATIENT)
Dept: PHYSICAL THERAPY | Age: 26
End: 2021-02-23
Payer: OTHER GOVERNMENT

## 2021-02-26 ENCOUNTER — HOSPITAL ENCOUNTER (OUTPATIENT)
Dept: PHYSICAL THERAPY | Age: 26
Discharge: HOME | End: 2021-02-26
Payer: OTHER GOVERNMENT

## 2021-02-26 PROCEDURE — 97110 THERAPEUTIC EXERCISES: CPT

## 2021-02-26 NOTE — PROGRESS NOTES
PHYSICAL THERAPY - DAILY TREATMENT NOTE    Patient Name: Sabrina Power        Date: 2021  : 1995   YES Patient  Verified  Visit #:      of     Insurance: Payor:  / Plan: Tommy Muniz DEPENDENTS / Product Type:  /      In time: 8:00 A Out time: 8:55 A   Total Treatment Time: 50     BCBS/Medicare Time Tracking (below)   Total Timed Codes (min):  NA 1:1 Treatment Time:  NA     TREATMENT AREA = Neck pain [M54.2]    SUBJECTIVE  Pain Level (on 0 to 10 scale):  4  / 10   Medication Changes/New allergies or changes in medical history, any new surgeries or procedures?     NO    If yes, update Summary List   Subjective Functional Status/Changes:  []  No changes reported     See DC summary            Modalities Rationale:    Patient deferred   min [] Estim, type/location:                                      []  att     []  unatt     []  w/US     []  w/ice    []  w/heat    min []  Mechanical Traction: type/lbs                   []  pro   []  sup   []  int   []  cont    []  before manual    []  after manual    min []  Ultrasound, settings/location:      min []  Iontophoresis w/ dexamethasone, location:                                               []  take home patch       []  in clinic    min []  Ice     []  Heat    location/position:     min []  Vasopneumatic Device, press/temp:     min []  Other:    [] Skin assessment post-treatment (if applicable):    []  intact    []  redness- no adverse reaction     []redness  adverse reaction:        50 min Therapeutic Exercise:  [x]  See flow sheet   Rationale:      increase ROM and increase strength to improve the patients ability to return to pain-free ADLs     Billed With/As:   [] TE   [] TA   [] Neuro   [] Self Care Patient Education: [x] Review HEP    [] Progressed/Changed HEP based on:   [] positioning   [] body mechanics   [] transfers   [] heat/ice application    [] other:      Other Objective/Functional Measures:    FOTO 59 points  (-) L ULTT     Post Treatment Pain Level (on 0 to 10) scale:   4  / 10     ASSESSMENT  Assessment/Changes in Function:     No change in L UE pain reduction with PT     []  See Progress Note/Recertification   Patient will continue to benefit from skilled PT services to instruct in home and community integration to attain remaining goals. Progress toward goals / Updated goals:    See DC summary for progress with goals      PLAN  []  Upgrade activities as tolerated NO Continue plan of care   []  Discharge due to :    []  Other:      Therapist: Frank Steel PT    Date: 2/26/2021 Time: 10:25 AM     No future appointments.

## 2021-02-26 NOTE — PROGRESS NOTES
[address] [facility] PHYSICAL THERAPY AT [address] [place] [address]  Rudy Persaud [facility], [address] ,[address], [address]  Phone: [phone]  Fax: [phone] SUMMARY  Patient Name: Faye Duran : 1995   Treatment/Medical Diagnosis: Neck pain [M54.2]   Referral Source: Mike Medina MD     Date of Initial Visit: 12-15-20 Attended Visits: 8 Missed Visits: 1     SUMMARY OF TREATMENT  Therapeutic exercise including ROM, stretching, gentle strengthening, stabilization training, postural ed, patient education, HEP instruction, ESTIM with P. CURRENT STATUS  Ms. Sahra Conley has made fair progress with PT, she continues to report primary c/o constant L upper arm/shoulder pain. Although previous c/o neck pain have decreased, L UE tingling/numbness have improved although c/o pain have remained relatively unchanged. She finished most recent series of injections with pain management ~ 2 weeks ago with no appreciable effect on pain reduction. Next f/u with pain management on 21. She is currently using home TENS unit & performing home program on a daily basis to manage her symptoms. Next f/u with Dr. Damián Johnston is in August, plan to DC to home program to manage her symptoms & she was recommended to f/u with MD regarding persistent c/o pain. Goal/Measure of Progress Goal Met? 1. Improve FOTO score from 57 points to > or = 67 points indicating improved tolerance with ADLs in regards to c/s. Status at last Eval: 62 Current Status: 59 Partially met   2. Patient to report 50% improvement in L UE symptoms in preparation for return to recreational activities with manageable sx in c/s. Status at last Eval: NA Current Status: 0% no   3. Patient will demonstrate (-) neural tension with ULTT test on L UE to overhead activities with manageable sx. Status at last Eval: (+) L ULTT  Current Status: (-) L ULTT  yes   4.   Patient will report decreased c/o pain in L UE to < or = 4-5/10 to facilitate light lifting at work with manageable sx. Status at last Eval: Average 7/10  At best 3/10 Current Status: Average 7-8/10  At best 3-4/10 no       RECOMMENDATIONS  Patient to be DC to home program having plateaued in terms of progress, recommended to f/u with MD given lack of progress with PT. If you have any questions/comments please contact us directly at [phone]. Thank you for allowing us to assist in the care of your patient. Therapist Signature: JEREMIE Franklin, cert MDT Date: 5/74/0108     Time: 8:02 AM   NOTE TO PHYSICIAN:  PLEASE COMPLETE THE ORDERS BELOW AND FAX TO   [facility]: [phone]. If you are unable to process this request in 24 hours please contact our office: [phone].    ___ I have read the above report and request that my patient continue as recommended.   ___ I have read the above report and request that my patient continue therapy with the following changes/special instructions:_________________________________________________________   ___ I have read the above report and request that my patient be discharged from therapy.      Physician Signature:        Date:       Time: